# Patient Record
Sex: FEMALE | Race: WHITE
[De-identification: names, ages, dates, MRNs, and addresses within clinical notes are randomized per-mention and may not be internally consistent; named-entity substitution may affect disease eponyms.]

---

## 2022-05-18 ENCOUNTER — APPOINTMENT (OUTPATIENT)
Dept: NEUROLOGY | Facility: CLINIC | Age: 52
End: 2022-05-18
Payer: COMMERCIAL

## 2022-05-18 DIAGNOSIS — R20.0 ANESTHESIA OF SKIN: ICD-10-CM

## 2022-05-18 DIAGNOSIS — R20.2 ANESTHESIA OF SKIN: ICD-10-CM

## 2022-05-18 DIAGNOSIS — M79.641 PAIN IN RIGHT HAND: ICD-10-CM

## 2022-05-18 DIAGNOSIS — M79.642 PAIN IN LEFT HAND: ICD-10-CM

## 2022-05-18 PROBLEM — Z00.00 ENCOUNTER FOR PREVENTIVE HEALTH EXAMINATION: Status: ACTIVE | Noted: 2022-05-18

## 2022-05-18 PROCEDURE — 95886 MUSC TEST DONE W/N TEST COMP: CPT | Mod: 59

## 2022-05-18 PROCEDURE — 95911 NRV CNDJ TEST 9-10 STUDIES: CPT | Mod: 59

## 2022-05-24 ENCOUNTER — APPOINTMENT (OUTPATIENT)
Dept: ORTHOPEDIC SURGERY | Facility: CLINIC | Age: 52
End: 2022-05-24
Payer: COMMERCIAL

## 2022-05-24 DIAGNOSIS — Z78.9 OTHER SPECIFIED HEALTH STATUS: ICD-10-CM

## 2022-05-24 PROCEDURE — L3908: CPT | Mod: 50

## 2022-05-24 PROCEDURE — 99214 OFFICE O/P EST MOD 30 MIN: CPT | Mod: 25

## 2022-05-24 PROCEDURE — 20526 THER INJECTION CARP TUNNEL: CPT | Mod: 50

## 2022-06-09 ENCOUNTER — TRANSCRIPTION ENCOUNTER (OUTPATIENT)
Age: 52
End: 2022-06-09

## 2022-06-21 ENCOUNTER — APPOINTMENT (OUTPATIENT)
Dept: ORTHOPEDIC SURGERY | Facility: CLINIC | Age: 52
End: 2022-06-21
Payer: COMMERCIAL

## 2022-06-21 DIAGNOSIS — G56.03 CARPAL TUNNEL SYNDROM,BILATERAL UPPER LIMBS: ICD-10-CM

## 2022-06-21 PROCEDURE — 99213 OFFICE O/P EST LOW 20 MIN: CPT

## 2022-08-09 ENCOUNTER — APPOINTMENT (OUTPATIENT)
Dept: ORTHOPEDIC SURGERY | Facility: CLINIC | Age: 52
End: 2022-08-09

## 2022-09-15 ENCOUNTER — APPOINTMENT (OUTPATIENT)
Dept: ORTHOPEDIC SURGERY | Facility: CLINIC | Age: 52
End: 2022-09-15

## 2022-09-15 VITALS — WEIGHT: 200 LBS | HEIGHT: 63.5 IN | BODY MASS INDEX: 35 KG/M2

## 2022-09-15 DIAGNOSIS — Z78.9 OTHER SPECIFIED HEALTH STATUS: ICD-10-CM

## 2022-09-15 DIAGNOSIS — M47.896 OTHER SPONDYLOSIS, LUMBAR REGION: ICD-10-CM

## 2022-09-15 PROCEDURE — 73503 X-RAY EXAM HIP UNI 4/> VIEWS: CPT | Mod: RT

## 2022-09-15 PROCEDURE — 99214 OFFICE O/P EST MOD 30 MIN: CPT

## 2022-09-30 ENCOUNTER — APPOINTMENT (OUTPATIENT)
Dept: PAIN MANAGEMENT | Facility: CLINIC | Age: 52
End: 2022-09-30

## 2022-09-30 PROCEDURE — 27093 INJECTION FOR HIP X-RAY: CPT | Mod: RT

## 2022-09-30 PROCEDURE — 73525 CONTRAST X-RAY OF HIP: CPT | Mod: RT

## 2022-09-30 PROCEDURE — J3490M: CUSTOM

## 2022-11-03 ENCOUNTER — APPOINTMENT (OUTPATIENT)
Dept: ORTHOPEDIC SURGERY | Facility: CLINIC | Age: 52
End: 2022-11-03

## 2022-11-03 PROCEDURE — 99213 OFFICE O/P EST LOW 20 MIN: CPT

## 2022-12-30 ENCOUNTER — APPOINTMENT (OUTPATIENT)
Dept: PAIN MANAGEMENT | Facility: CLINIC | Age: 52
End: 2022-12-30
Payer: COMMERCIAL

## 2022-12-30 PROCEDURE — 27093 INJECTION FOR HIP X-RAY: CPT | Mod: RT

## 2022-12-30 PROCEDURE — J3490M: CUSTOM

## 2022-12-30 PROCEDURE — 73525 CONTRAST X-RAY OF HIP: CPT | Mod: 26,59

## 2023-01-19 ENCOUNTER — APPOINTMENT (OUTPATIENT)
Dept: ORTHOPEDIC SURGERY | Facility: CLINIC | Age: 53
End: 2023-01-19

## 2023-03-07 ENCOUNTER — NON-APPOINTMENT (OUTPATIENT)
Age: 53
End: 2023-03-07

## 2023-03-07 ENCOUNTER — APPOINTMENT (OUTPATIENT)
Dept: ORTHOPEDIC SURGERY | Facility: CLINIC | Age: 53
End: 2023-03-07
Payer: COMMERCIAL

## 2023-03-07 VITALS — HEIGHT: 63.5 IN | BODY MASS INDEX: 35 KG/M2 | WEIGHT: 200 LBS

## 2023-03-07 PROCEDURE — 99215 OFFICE O/P EST HI 40 MIN: CPT

## 2023-05-18 ENCOUNTER — OUTPATIENT (OUTPATIENT)
Dept: OUTPATIENT SERVICES | Facility: HOSPITAL | Age: 53
LOS: 1 days | End: 2023-05-18
Payer: COMMERCIAL

## 2023-05-18 VITALS
WEIGHT: 207.9 LBS | HEIGHT: 64 IN | DIASTOLIC BLOOD PRESSURE: 80 MMHG | TEMPERATURE: 98 F | SYSTOLIC BLOOD PRESSURE: 140 MMHG | RESPIRATION RATE: 14 BRPM | HEART RATE: 80 BPM | OXYGEN SATURATION: 99 %

## 2023-05-18 DIAGNOSIS — M16.11 UNILATERAL PRIMARY OSTEOARTHRITIS, RIGHT HIP: ICD-10-CM

## 2023-05-18 DIAGNOSIS — Z98.82 BREAST IMPLANT STATUS: Chronic | ICD-10-CM

## 2023-05-18 DIAGNOSIS — Z01.818 ENCOUNTER FOR OTHER PREPROCEDURAL EXAMINATION: ICD-10-CM

## 2023-05-18 LAB
A1C WITH ESTIMATED AVERAGE GLUCOSE RESULT: 5.1 % — SIGNIFICANT CHANGE UP (ref 4–5.6)
ALBUMIN SERPL ELPH-MCNC: 3.7 G/DL — SIGNIFICANT CHANGE UP (ref 3.3–5)
ALP SERPL-CCNC: 115 U/L — SIGNIFICANT CHANGE UP (ref 30–120)
ALT FLD-CCNC: 45 U/L DA — SIGNIFICANT CHANGE UP (ref 10–60)
ANION GAP SERPL CALC-SCNC: 10 MMOL/L — SIGNIFICANT CHANGE UP (ref 5–17)
APTT BLD: 29 SEC — SIGNIFICANT CHANGE UP (ref 27.5–35.5)
AST SERPL-CCNC: 46 U/L — HIGH (ref 10–40)
BILIRUB SERPL-MCNC: 0.4 MG/DL — SIGNIFICANT CHANGE UP (ref 0.2–1.2)
BLD GP AB SCN SERPL QL: SIGNIFICANT CHANGE UP
BUN SERPL-MCNC: 10 MG/DL — SIGNIFICANT CHANGE UP (ref 7–23)
CALCIUM SERPL-MCNC: 9.5 MG/DL — SIGNIFICANT CHANGE UP (ref 8.4–10.5)
CHLORIDE SERPL-SCNC: 100 MMOL/L — SIGNIFICANT CHANGE UP (ref 96–108)
CO2 SERPL-SCNC: 29 MMOL/L — SIGNIFICANT CHANGE UP (ref 22–31)
CREAT SERPL-MCNC: 0.73 MG/DL — SIGNIFICANT CHANGE UP (ref 0.5–1.3)
EGFR: 99 ML/MIN/1.73M2 — SIGNIFICANT CHANGE UP
ESTIMATED AVERAGE GLUCOSE: 100 MG/DL — SIGNIFICANT CHANGE UP (ref 68–114)
GLUCOSE SERPL-MCNC: 93 MG/DL — SIGNIFICANT CHANGE UP (ref 70–99)
HCT VFR BLD CALC: 39.1 % — SIGNIFICANT CHANGE UP (ref 34.5–45)
HGB BLD-MCNC: 13.4 G/DL — SIGNIFICANT CHANGE UP (ref 11.5–15.5)
INR BLD: 0.94 RATIO — SIGNIFICANT CHANGE UP (ref 0.88–1.16)
MCHC RBC-ENTMCNC: 32.4 PG — SIGNIFICANT CHANGE UP (ref 27–34)
MCHC RBC-ENTMCNC: 34.3 GM/DL — SIGNIFICANT CHANGE UP (ref 32–36)
MCV RBC AUTO: 94.7 FL — SIGNIFICANT CHANGE UP (ref 80–100)
NRBC # BLD: 0 /100 WBCS — SIGNIFICANT CHANGE UP (ref 0–0)
PLATELET # BLD AUTO: 210 K/UL — SIGNIFICANT CHANGE UP (ref 150–400)
POTASSIUM SERPL-MCNC: 4.2 MMOL/L — SIGNIFICANT CHANGE UP (ref 3.5–5.3)
POTASSIUM SERPL-SCNC: 4.2 MMOL/L — SIGNIFICANT CHANGE UP (ref 3.5–5.3)
PROT SERPL-MCNC: 7.8 G/DL — SIGNIFICANT CHANGE UP (ref 6–8.3)
PROTHROM AB SERPL-ACNC: 11.1 SEC — SIGNIFICANT CHANGE UP (ref 10.5–13.4)
RBC # BLD: 4.13 M/UL — SIGNIFICANT CHANGE UP (ref 3.8–5.2)
RBC # FLD: 13 % — SIGNIFICANT CHANGE UP (ref 10.3–14.5)
SODIUM SERPL-SCNC: 139 MMOL/L — SIGNIFICANT CHANGE UP (ref 135–145)
WBC # BLD: 6.02 K/UL — SIGNIFICANT CHANGE UP (ref 3.8–10.5)
WBC # FLD AUTO: 6.02 K/UL — SIGNIFICANT CHANGE UP (ref 3.8–10.5)

## 2023-05-18 PROCEDURE — 86901 BLOOD TYPING SEROLOGIC RH(D): CPT

## 2023-05-18 PROCEDURE — 85027 COMPLETE CBC AUTOMATED: CPT

## 2023-05-18 PROCEDURE — 80053 COMPREHEN METABOLIC PANEL: CPT

## 2023-05-18 PROCEDURE — 85610 PROTHROMBIN TIME: CPT

## 2023-05-18 PROCEDURE — 36415 COLL VENOUS BLD VENIPUNCTURE: CPT

## 2023-05-18 PROCEDURE — 93005 ELECTROCARDIOGRAM TRACING: CPT

## 2023-05-18 PROCEDURE — 85730 THROMBOPLASTIN TIME PARTIAL: CPT

## 2023-05-18 PROCEDURE — 86850 RBC ANTIBODY SCREEN: CPT

## 2023-05-18 PROCEDURE — 83036 HEMOGLOBIN GLYCOSYLATED A1C: CPT

## 2023-05-18 PROCEDURE — G0463: CPT

## 2023-05-18 PROCEDURE — 86900 BLOOD TYPING SEROLOGIC ABO: CPT

## 2023-05-18 PROCEDURE — 93010 ELECTROCARDIOGRAM REPORT: CPT

## 2023-05-18 PROCEDURE — 87640 STAPH A DNA AMP PROBE: CPT

## 2023-05-18 PROCEDURE — 87641 MR-STAPH DNA AMP PROBE: CPT

## 2023-05-18 NOTE — H&P PST ADULT - HISTORY OF PRESENT ILLNESS
This is a 54 y/o female who presents with progressively worsening right gip pain and limping for the past 10 months . Reports constant pain and worse pain when walking , pain affects the sleep and wakes her from sleep . scheduled for right hip replacement on 6/5/23

## 2023-05-18 NOTE — H&P PST ADULT - PROBLEM SELECTOR PLAN 1
scheduled for right hip replacement on 6/8/23   medical clearance   Pre op instructions   [facility] on admit

## 2023-05-18 NOTE — H&P PST ADULT - NSICDXPASTMEDICALHX_GEN_ALL_CORE_FT
PAST MEDICAL HISTORY:  Anxiety and depression     HTN (hypertension)     Osteoarthritis of right hip

## 2023-05-19 LAB
MRSA PCR RESULT.: SIGNIFICANT CHANGE UP
S AUREUS DNA NOSE QL NAA+PROBE: SIGNIFICANT CHANGE UP

## 2023-05-30 ENCOUNTER — APPOINTMENT (OUTPATIENT)
Dept: ORTHOPEDIC SURGERY | Facility: CLINIC | Age: 53
End: 2023-05-30
Payer: COMMERCIAL

## 2023-05-30 ENCOUNTER — APPOINTMENT (OUTPATIENT)
Dept: ORTHOPEDIC SURGERY | Facility: CLINIC | Age: 53
End: 2023-05-30

## 2023-05-30 VITALS — BODY MASS INDEX: 35 KG/M2 | HEIGHT: 63.5 IN | WEIGHT: 200 LBS

## 2023-05-30 DIAGNOSIS — M16.11 UNILATERAL PRIMARY OSTEOARTHRITIS, RIGHT HIP: ICD-10-CM

## 2023-05-30 PROCEDURE — 73503 X-RAY EXAM HIP UNI 4/> VIEWS: CPT | Mod: RT

## 2023-05-30 PROCEDURE — 99213 OFFICE O/P EST LOW 20 MIN: CPT

## 2023-05-31 RX ORDER — CHLORHEXIDINE GLUCONATE 213 G/1000ML
1 SOLUTION TOPICAL ONCE
Refills: 0 | Status: COMPLETED | OUTPATIENT
Start: 2023-06-05 | End: 2023-06-05

## 2023-05-31 RX ORDER — ACETAMINOPHEN 500 MG
1000 TABLET ORAL ONCE
Refills: 0 | Status: COMPLETED | OUTPATIENT
Start: 2023-06-05 | End: 2023-06-05

## 2023-05-31 RX ORDER — TRANEXAMIC ACID 100 MG/ML
1000 INJECTION, SOLUTION INTRAVENOUS ONCE
Refills: 0 | Status: COMPLETED | OUTPATIENT
Start: 2023-06-05 | End: 2023-06-05

## 2023-05-31 RX ORDER — CEFAZOLIN SODIUM 1 G
2000 VIAL (EA) INJECTION ONCE
Refills: 0 | Status: COMPLETED | OUTPATIENT
Start: 2023-06-05 | End: 2023-06-05

## 2023-05-31 RX ORDER — APREPITANT 80 MG/1
40 CAPSULE ORAL ONCE
Refills: 0 | Status: COMPLETED | OUTPATIENT
Start: 2023-06-05 | End: 2023-06-05

## 2023-06-04 ENCOUNTER — TRANSCRIPTION ENCOUNTER (OUTPATIENT)
Age: 53
End: 2023-06-04

## 2023-06-05 ENCOUNTER — TRANSCRIPTION ENCOUNTER (OUTPATIENT)
Age: 53
End: 2023-06-05

## 2023-06-05 ENCOUNTER — INPATIENT (INPATIENT)
Facility: HOSPITAL | Age: 53
LOS: 0 days | Discharge: HOME | DRG: 470 | End: 2023-06-06
Attending: ORTHOPAEDIC SURGERY | Admitting: ORTHOPAEDIC SURGERY
Payer: COMMERCIAL

## 2023-06-05 ENCOUNTER — APPOINTMENT (OUTPATIENT)
Dept: ORTHOPEDIC SURGERY | Facility: HOSPITAL | Age: 53
End: 2023-06-05
Payer: COMMERCIAL

## 2023-06-05 VITALS
TEMPERATURE: 98 F | DIASTOLIC BLOOD PRESSURE: 99 MMHG | RESPIRATION RATE: 18 BRPM | WEIGHT: 205.25 LBS | HEIGHT: 63.5 IN | SYSTOLIC BLOOD PRESSURE: 141 MMHG | OXYGEN SATURATION: 98 % | HEART RATE: 95 BPM

## 2023-06-05 DIAGNOSIS — M16.11 UNILATERAL PRIMARY OSTEOARTHRITIS, RIGHT HIP: ICD-10-CM

## 2023-06-05 DIAGNOSIS — Z98.82 BREAST IMPLANT STATUS: Chronic | ICD-10-CM

## 2023-06-05 DIAGNOSIS — Z98.890 OTHER SPECIFIED POSTPROCEDURAL STATES: Chronic | ICD-10-CM

## 2023-06-05 LAB
ABO RH CONFIRMATION: SIGNIFICANT CHANGE UP
HCG UR QL: NEGATIVE — SIGNIFICANT CHANGE UP

## 2023-06-05 PROCEDURE — 72170 X-RAY EXAM OF PELVIS: CPT | Mod: 26

## 2023-06-05 PROCEDURE — 20985 CPTR-ASST DIR MS PX: CPT

## 2023-06-05 PROCEDURE — 73502 X-RAY EXAM HIP UNI 2-3 VIEWS: CPT | Mod: 26

## 2023-06-05 PROCEDURE — 99232 SBSQ HOSP IP/OBS MODERATE 35: CPT

## 2023-06-05 PROCEDURE — 27130 TOTAL HIP ARTHROPLASTY: CPT | Mod: RT

## 2023-06-05 PROCEDURE — 20610 DRAIN/INJ JOINT/BURSA W/O US: CPT | Mod: 59,RT

## 2023-06-05 PROCEDURE — 27130 TOTAL HIP ARTHROPLASTY: CPT | Mod: AS,RT

## 2023-06-05 DEVICE — SCREW PELVIC G2 STD 116MM: Type: IMPLANTABLE DEVICE | Site: RIGHT | Status: FUNCTIONAL

## 2023-06-05 DEVICE — STEM FEM POLARSTEM COLLAR STD TI HA 0 135 DEG 12/14 125MM: Type: IMPLANTABLE DEVICE | Site: RIGHT | Status: FUNCTIONAL

## 2023-06-05 DEVICE — LINER ACET OR3O DUAL MOBILITY 42/54: Type: IMPLANTABLE DEVICE | Site: RIGHT | Status: FUNCTIONAL

## 2023-06-05 DEVICE — INSERT ACET OR3O DM XLPE 28/42: Type: IMPLANTABLE DEVICE | Site: RIGHT | Status: FUNCTIONAL

## 2023-06-05 DEVICE — SHELL ACET R3 STD NO HOLE 54MM: Type: IMPLANTABLE DEVICE | Site: RIGHT | Status: FUNCTIONAL

## 2023-06-05 DEVICE — HEAD TAPER FEMORAL 28MM OXINIUM: Type: IMPLANTABLE DEVICE | Site: RIGHT | Status: FUNCTIONAL

## 2023-06-05 RX ORDER — ACETAMINOPHEN 500 MG
1000 TABLET ORAL EVERY 8 HOURS
Refills: 0 | Status: DISCONTINUED | OUTPATIENT
Start: 2023-06-05 | End: 2023-06-06

## 2023-06-05 RX ORDER — ONDANSETRON 8 MG/1
4 TABLET, FILM COATED ORAL EVERY 6 HOURS
Refills: 0 | Status: DISCONTINUED | OUTPATIENT
Start: 2023-06-05 | End: 2023-06-06

## 2023-06-05 RX ORDER — ONDANSETRON 8 MG/1
4 TABLET, FILM COATED ORAL ONCE
Refills: 0 | Status: DISCONTINUED | OUTPATIENT
Start: 2023-06-05 | End: 2023-06-05

## 2023-06-05 RX ORDER — CEFAZOLIN SODIUM 1 G
2000 VIAL (EA) INJECTION EVERY 8 HOURS
Refills: 0 | Status: COMPLETED | OUTPATIENT
Start: 2023-06-05 | End: 2023-06-06

## 2023-06-05 RX ORDER — SODIUM CHLORIDE 9 MG/ML
1000 INJECTION, SOLUTION INTRAVENOUS
Refills: 0 | Status: DISCONTINUED | OUTPATIENT
Start: 2023-06-05 | End: 2023-06-05

## 2023-06-05 RX ORDER — HYDROMORPHONE HYDROCHLORIDE 2 MG/ML
4 INJECTION INTRAMUSCULAR; INTRAVENOUS; SUBCUTANEOUS
Refills: 0 | Status: DISCONTINUED | OUTPATIENT
Start: 2023-06-05 | End: 2023-06-06

## 2023-06-05 RX ORDER — ASPIRIN/CALCIUM CARB/MAGNESIUM 324 MG
81 TABLET ORAL EVERY 12 HOURS
Refills: 0 | Status: DISCONTINUED | OUTPATIENT
Start: 2023-06-06 | End: 2023-06-06

## 2023-06-05 RX ORDER — HYDROMORPHONE HYDROCHLORIDE 2 MG/ML
0.5 INJECTION INTRAMUSCULAR; INTRAVENOUS; SUBCUTANEOUS
Refills: 0 | Status: DISCONTINUED | OUTPATIENT
Start: 2023-06-05 | End: 2023-06-05

## 2023-06-05 RX ORDER — SODIUM CHLORIDE 9 MG/ML
1000 INJECTION, SOLUTION INTRAVENOUS
Refills: 0 | Status: DISCONTINUED | OUTPATIENT
Start: 2023-06-05 | End: 2023-06-06

## 2023-06-05 RX ORDER — LOSARTAN POTASSIUM 100 MG/1
50 TABLET, FILM COATED ORAL DAILY
Refills: 0 | Status: CANCELLED | OUTPATIENT
Start: 2023-06-07 | End: 2023-06-06

## 2023-06-05 RX ORDER — BUPROPION HYDROCHLORIDE 150 MG/1
300 TABLET, EXTENDED RELEASE ORAL DAILY
Refills: 0 | Status: DISCONTINUED | OUTPATIENT
Start: 2023-06-05 | End: 2023-06-06

## 2023-06-05 RX ORDER — CELECOXIB 200 MG/1
200 CAPSULE ORAL EVERY 12 HOURS
Refills: 0 | Status: DISCONTINUED | OUTPATIENT
Start: 2023-06-05 | End: 2023-06-06

## 2023-06-05 RX ORDER — MAGNESIUM HYDROXIDE 400 MG/1
30 TABLET, CHEWABLE ORAL DAILY
Refills: 0 | Status: DISCONTINUED | OUTPATIENT
Start: 2023-06-05 | End: 2023-06-06

## 2023-06-05 RX ORDER — SENNA PLUS 8.6 MG/1
2 TABLET ORAL AT BEDTIME
Refills: 0 | Status: DISCONTINUED | OUTPATIENT
Start: 2023-06-05 | End: 2023-06-06

## 2023-06-05 RX ORDER — HYDROMORPHONE HYDROCHLORIDE 2 MG/ML
2 INJECTION INTRAMUSCULAR; INTRAVENOUS; SUBCUTANEOUS
Refills: 0 | Status: DISCONTINUED | OUTPATIENT
Start: 2023-06-05 | End: 2023-06-06

## 2023-06-05 RX ORDER — DEXAMETHASONE 0.5 MG/5ML
8 ELIXIR ORAL ONCE
Refills: 0 | Status: COMPLETED | OUTPATIENT
Start: 2023-06-06 | End: 2023-06-06

## 2023-06-05 RX ORDER — ACETAMINOPHEN 500 MG
1000 TABLET ORAL ONCE
Refills: 0 | Status: COMPLETED | OUTPATIENT
Start: 2023-06-05 | End: 2023-06-05

## 2023-06-05 RX ORDER — HYDROMORPHONE HYDROCHLORIDE 2 MG/ML
0.5 INJECTION INTRAMUSCULAR; INTRAVENOUS; SUBCUTANEOUS
Refills: 0 | Status: DISCONTINUED | OUTPATIENT
Start: 2023-06-05 | End: 2023-06-06

## 2023-06-05 RX ORDER — POLYETHYLENE GLYCOL 3350 17 G/17G
17 POWDER, FOR SOLUTION ORAL AT BEDTIME
Refills: 0 | Status: DISCONTINUED | OUTPATIENT
Start: 2023-06-05 | End: 2023-06-06

## 2023-06-05 RX ORDER — HYDROMORPHONE HYDROCHLORIDE 2 MG/ML
1 INJECTION INTRAMUSCULAR; INTRAVENOUS; SUBCUTANEOUS
Refills: 0 | Status: DISCONTINUED | OUTPATIENT
Start: 2023-06-05 | End: 2023-06-05

## 2023-06-05 RX ORDER — PANTOPRAZOLE SODIUM 20 MG/1
40 TABLET, DELAYED RELEASE ORAL
Refills: 0 | Status: DISCONTINUED | OUTPATIENT
Start: 2023-06-05 | End: 2023-06-06

## 2023-06-05 RX ADMIN — Medication 400 MILLIGRAM(S): at 15:32

## 2023-06-05 RX ADMIN — HYDROMORPHONE HYDROCHLORIDE 2 MILLIGRAM(S): 2 INJECTION INTRAMUSCULAR; INTRAVENOUS; SUBCUTANEOUS at 19:25

## 2023-06-05 RX ADMIN — CELECOXIB 200 MILLIGRAM(S): 200 CAPSULE ORAL at 21:29

## 2023-06-05 RX ADMIN — HYDROMORPHONE HYDROCHLORIDE 2 MILLIGRAM(S): 2 INJECTION INTRAMUSCULAR; INTRAVENOUS; SUBCUTANEOUS at 20:00

## 2023-06-05 RX ADMIN — POLYETHYLENE GLYCOL 3350 17 GRAM(S): 17 POWDER, FOR SOLUTION ORAL at 21:30

## 2023-06-05 RX ADMIN — Medication 100 MILLIGRAM(S): at 16:33

## 2023-06-05 RX ADMIN — Medication 1000 MILLIGRAM(S): at 21:29

## 2023-06-05 RX ADMIN — SENNA PLUS 2 TABLET(S): 8.6 TABLET ORAL at 21:30

## 2023-06-05 RX ADMIN — Medication 1000 MILLIGRAM(S): at 17:28

## 2023-06-05 RX ADMIN — CELECOXIB 200 MILLIGRAM(S): 200 CAPSULE ORAL at 21:34

## 2023-06-05 RX ADMIN — APREPITANT 40 MILLIGRAM(S): 80 CAPSULE ORAL at 07:53

## 2023-06-05 RX ADMIN — CHLORHEXIDINE GLUCONATE 1 APPLICATION(S): 213 SOLUTION TOPICAL at 07:53

## 2023-06-05 RX ADMIN — Medication 1000 MILLIGRAM(S): at 21:35

## 2023-06-05 NOTE — CONSULT NOTE ADULT - SUBJECTIVE AND OBJECTIVE BOX
HPI: 52yo PMHx HTN and anxiety/depression has been combatting pain in right hip for several months which has progressively worsened.  Patient has tried multiple options for pain relief including OTC medication and as well as PT with minimal relief and has undergone elective replacement of right hip successfully.  Patient is currently resting in bed comfortable with good pain control.     REVIEW OF SYSTEMS:  CONSTITUTIONAL: No fever, weight loss, or fatigue  EYES: No eye pain, visual disturbances, or discharge  ENMT:  No difficulty hearing, tinnitus, vertigo; No sinus or throat pain  NECK: No pain or stiffness  RESPIRATORY: No cough, wheezing, chills or hemoptysis; No shortness of breath  CARDIOVASCULAR: No chest pain, palpitations, dizziness, or leg swelling  GASTROINTESTINAL: No abdominal or epigastric pain. No nausea, vomiting, or hematemesis; No diarrhea or constipation. No melena or hematochezia.  GENITOURINARY: No dysuria, frequency, hematuria, or incontinence  NEUROLOGICAL: No headaches, memory loss, loss of strength, numbness, or tremors  MUSCULOSKELETAL: No muscle or back pain      PAST MEDICAL & SURGICAL HISTORY:  HTN (hypertension)      Osteoarthritis of right hip      Anxiety and depression      S/P breast augmentation      H/O colonoscopy          SOCIAL HISTORY:  Tobacco; EtOH; Illicit Drugs    Allergies    Cipro (Other)  ampicillin (Other)    Intolerances        MEDICATIONS  (STANDING):  acetaminophen     Tablet .. 1000 milliGRAM(s) Oral every 8 hours  acetaminophen   IVPB .. 1000 milliGRAM(s) IV Intermittent once  buPROPion XL (24-Hour) . 300 milliGRAM(s) Oral daily  ceFAZolin   IVPB 2000 milliGRAM(s) IV Intermittent every 8 hours  celecoxib 200 milliGRAM(s) Oral every 12 hours  lactated ringers. 1000 milliLiter(s) (150 mL/Hr) IV Continuous <Continuous>  lactated ringers. 1000 milliLiter(s) (75 mL/Hr) IV Continuous <Continuous>  pantoprazole    Tablet 40 milliGRAM(s) Oral before breakfast  polyethylene glycol 3350 17 Gram(s) Oral at bedtime  senna 2 Tablet(s) Oral at bedtime    MEDICATIONS  (PRN):  aluminum hydroxide/magnesium hydroxide/simethicone Suspension 30 milliLiter(s) Oral four times a day PRN Indigestion  HYDROmorphone   Tablet 2 milliGRAM(s) Oral every 3 hours PRN Moderate Pain (4 - 6)  HYDROmorphone   Tablet 4 milliGRAM(s) Oral every 3 hours PRN Severe Pain (7 - 10)  HYDROmorphone  Injectable 0.5 milliGRAM(s) IV Push every 3 hours PRN for breakthrough pain  HYDROmorphone  Injectable 0.5 milliGRAM(s) IV Push every 10 minutes PRN Moderate Pain (4 - 6)  HYDROmorphone  Injectable 1 milliGRAM(s) IV Push every 10 minutes PRN Severe Pain (7 - 10)  magnesium hydroxide Suspension 30 milliLiter(s) Oral daily PRN Constipation  ondansetron Injectable 4 milliGRAM(s) IV Push once PRN Nausea and/or Vomiting  ondansetron Injectable 4 milliGRAM(s) IV Push every 6 hours PRN Nausea and/or Vomiting      FAMILY HISTORY:      Vital Signs Last 24 Hrs  T(C): 36.9 (05 Jun 2023 13:42), Max: 36.9 (05 Jun 2023 13:42)  T(F): 98.4 (05 Jun 2023 13:42), Max: 98.4 (05 Jun 2023 13:42)  HR: 92 (05 Jun 2023 13:42) (56 - 95)  BP: 118/78 (05 Jun 2023 13:42) (90/62 - 145/66)  BP(mean): --  RR: 16 (05 Jun 2023 13:42) (12 - 20)  SpO2: 97% (05 Jun 2023 13:42) (97% - 100%)    Parameters below as of 05 Jun 2023 12:20  Patient On (Oxygen Delivery Method): nasal cannula        PHYSICAL EXAM:    GENERAL: NAD, well-developed  HEAD:  Atraumatic, Normocephalic  EYES: EOMI, PERRLA, conjunctiva and sclera clear  ENMT: No tonsillar erythema, exudates, or enlargement; Moist mucous membranes, Good dentition, No lesions  NECK: Supple, No JVD, Normal thyroid  NERVOUS SYSTEM:  Alert & Oriented X3, Good concentration;  CHEST/LUNG: Clear to auscultation bilaterally; No rales, rhonchi, wheezing, or rubs  HEART: Regular rate and rhythm; No murmurs, rubs, or gallops  ABDOMEN: Soft, Nontender, Nondistended; Bowel sounds present  EXTREMITIES:  2+ Peripheral Pulses, No clubbing, cyanosis, or edema      LABS:              CAPILLARY BLOOD GLUCOSE          RADIOLOGY & ADDITIONAL STUDIES:    EKG:   Personally Reviewed:  [ ] YES     Imaging:   Personally Reviewed:  [ ] YES     Consultant(s) Notes Reviewed:      Care Discussed with Consultants/Other Providers:

## 2023-06-05 NOTE — PHYSICAL THERAPY INITIAL EVALUATION ADULT - MD/RN NOTIFIED
PLAN:  Follow up as directed.    Additional Educational Resources:  For additional resources regarding your symptoms, diagnosis, or further health information, please visit the Online Health Resources section in MyChart.  
yes

## 2023-06-05 NOTE — PHYSICAL THERAPY INITIAL EVALUATION ADULT - GAIT TRAINING, PT EVAL
Patient will ambulate 150 feet independently with a rolling walker within 1-2 days for safe community ambulation. Patient will ascend/descend 6 stairs independently with +HR and straight cane within 1-2 days for safe household stair negotiation.

## 2023-06-05 NOTE — PHYSICAL THERAPY INITIAL EVALUATION ADULT - ADDITIONAL COMMENTS
Pt lives with  and mother in a private home, there are 6 stairs +HR to enter and 5 stairs +HR to the primary bedroom/bathroom. Pt has a walk in shower. PTA pt was independent with ADLs and ambulation.

## 2023-06-05 NOTE — BRIEF OPERATIVE NOTE - NSICDXBRIEFPOSTOP_GEN_ALL_CORE_FT
Admission
POST-OP DIAGNOSIS:  Primary localized osteoarthritis of right hip 05-Jun-2023 07:51:45  Elian Cabrales

## 2023-06-05 NOTE — CONSULT NOTE ADULT - ASSESSMENT
52yo PMHx HTN and anxiety/depression s/p R MYLES.     #s/p R MYLES  OT/PT eval  pain and BM regimen per ortho  DVT ppx  Incentive spirometry  obtain basic labs    #HTN  resume home Telmisartan - HCTZ on pod2    #Depresison/anxiety  c/w home bupropion

## 2023-06-05 NOTE — PATIENT PROFILE ADULT - FALL HARM RISK - UNIVERSAL INTERVENTIONS
Bed in lowest position, wheels locked, appropriate side rails in place/Call bell, personal items and telephone in reach/Instruct patient to call for assistance before getting out of bed or chair/Non-slip footwear when patient is out of bed/[place] to call system/Physically safe environment - no spills, clutter or unnecessary equipment/Purposeful Proactive Rounding/Room/bathroom lighting operational, light cord in reach

## 2023-06-05 NOTE — DISCHARGE NOTE PROVIDER - NSDCCPCAREPLAN_GEN_ALL_CORE_FT
PRINCIPAL DISCHARGE DIAGNOSIS  Diagnosis: Primary localized osteoarthritis of right hip  Assessment and Plan of Treatment: Physical Therapy /Occupational Therapy for: Ambulation, Transfers , Stairs, ADLs (activities of daily living), isometrics.  TOTAL HIP PRECAUTIONS  *Remember to continue all of the precautions for total hip replacement. Your surgeon will tell you when and if you can move beyond these limitations.  • Do not bend your hip more than 90 degrees   • Do not cross your legs or ankles when laying sitting or standing.  • DO NOT bend over at your waist.  • Avoid sitting in low, soft chairs such as sofas and car seats. You should sit on a chair using firm pillows to raise the height of the seat.  • Make sure your bed level is high, so that you maintain proper leg positioning when sitting on the side, or getting in or out.  • When entering and traveling by car, sit in the front passenger seat. Make sure that the car seat is all the way back and semi-reclined before entering.  • Do not allow your knees to come together when sitting or lying in bed. Use abduction pillow.  • Do not take a tub bath yet.   • Do not resume driving until you have your surgeon’s permission.  Keep incision clean and dry. May shower after surgery if no drainage from incision.  You have a ONOFRE negative pressure and water resistant dressing over your surgical wound and may shower.  Disconnect ONOFRE battery prior to showering, reconnect battery after showering and press orange button to resume ONOFRE power. Remove ONOFRE dressing 7 days after surgery or when the battery alarms and stops working. If drainage is noted from your wound, apply a dry sterile dressing and call your surgeon.     PRINCIPAL DISCHARGE DIAGNOSIS  Diagnosis: Primary localized osteoarthritis of right hip  Assessment and Plan of Treatment: Physical Therapy /Occupational Therapy for: Ambulation, Transfers , Stairs, ADLs (activities of daily living), isometrics.  TOTAL HIP PRECAUTIONS  *Remember to continue all of the precautions for total hip replacement. Your surgeon will tell you when and if you can move beyond these limitations.  • Do not bend your hip more than 90 degrees   • Do not cross your legs or ankles when laying sitting or standing.  • DO NOT bend over at your waist.  • Avoid sitting in low, soft chairs such as sofas and car seats. You should sit on a chair using firm pillows to raise the height of the seat.  • Make sure your bed level is high, so that you maintain proper leg positioning when sitting on the side, or getting in or out.  • When entering and traveling by car, sit in the front passenger seat. Make sure that the car seat is all the way back and semi-reclined before entering.  • Do not allow your knees to come together when sitting or lying in bed. Use regular pillow.  • Do not take a tub bath yet.   • Do not resume driving until you have your surgeon’s permission.  As per Dr. Jesus:  Patient does not need abduction pillow at home.  Patient can sleep with regular pillow between their knees.  Patient can sleep up on non operative side.  Keep incision clean and dry. May shower after surgery if no drainage from incision.  You have a ONOFRE negative pressure and water resistant dressing over your surgical wound and may shower.  Disconnect ONOFRE battery prior to showering, reconnect battery after showering and press orange button to resume ONOFRE power. Remove ONOFRE dressing 7 days after surgery or when the battery alarms and stops working. If drainage is noted from your wound, apply a dry sterile dressing and call your surgeon.  Opioid safety discussed w/ pt.  Do not keep opioid in the medicine cabinet in bathroom where others may have access to it.  Do not drive while taking opioid.  To dispose of it some pharmacies do have drop boxes as do police stations.  Do not flush or put in trash.

## 2023-06-05 NOTE — DISCHARGE NOTE PROVIDER - PROVIDER TOKENS
PROVIDER:[TOKEN:[[id number]],SCHEDULEDAPPT:[06/15/2023],SCHEDULEDAPPTTIME:[10:15 AM]] FREE:[LAST:[[place]],FIRST:[Dagoberto],PHONE:[phone]],FAX:[(   [phone]    -],ADDRESS:[[address] [place] [address]],SCHEDULEDAPPT:[06/15/2023],SCHEDULEDAPPTTIME:[10:15 AM]]

## 2023-06-05 NOTE — DISCHARGE NOTE PROVIDER - NSDCACTIVITY_GEN_ALL_CORE
Stairs allowed/Walking - Indoors allowed/Walking - Outdoors allowed/Follow Instructions Provided by your Surgical Team Do not drive or operate machinery/Showering allowed/Stairs allowed/Walking - Indoors allowed/No heavy lifting/straining/Walking - Outdoors allowed/Follow Instructions Provided by your Surgical Team

## 2023-06-05 NOTE — PHYSICAL THERAPY INITIAL EVALUATION ADULT - PERTINENT HX OF CURRENT PROBLEM, REHAB EVAL
Pt is a 54 y/o female with right hip primary OA. Pt is s/p right total hip replacement posterior approach on 6/5/23.

## 2023-06-05 NOTE — DISCHARGE NOTE PROVIDER - CARE PROVIDER_API CALL
Dagoberto Jesus Cj  Orthopaedic Surgery  [address] [place] [id number]  Phone: [phone]  Fax: [phone]  Scheduled Appointment: 06/15/2023 10:15 AM   Dagoberto Jesus  [address] [place], [place] [address]  Phone: [phone]  Fax: (   )    -  Scheduled Appointment: 06/15/2023 10:15 AM

## 2023-06-05 NOTE — DISCHARGE NOTE PROVIDER - NSDCMRMEDTOKEN_GEN_ALL_CORE_FT
buPROPion 300 mg/24 hours (XL) oral tablet, extended release: 1 orally once a day  telmisartan-hydrochlorothiazide 40 mg-12.5 mg oral tablet: 1 orally once a day   acetaminophen 500 mg oral tablet: 2 tab(s) orally every 8 hours  Aspirin Enteric Coated 81 mg oral delayed release tablet: 1 tab(s) orally every 12 hours take 2 hours before Celebrex/Meloxicam  buPROPion 300 mg/24 hours (XL) oral tablet, extended release: 1 orally once a day  CeleBREX 200 mg oral capsule: 1 cap(s) orally every 12 hours take 2 hours after aspirin  HYDROmorphone 2 mg oral tablet: 1 tab(s) orally every 4 hours as needed for  moderate pain may take 2 tabs as needed for severe pain. Do Not combine with other sedating medications. Do Not Exceed Max Daily dose of 6 tabs. [facility] : [id number] MDD: 6  omeprazole 20 mg oral delayed release capsule: 1 cap(s) orally once a day prior to a meal (breakfast)  telmisartan-hydrochlorothiazide 40 mg-12.5 mg oral tablet: 1 orally once a day

## 2023-06-05 NOTE — PATIENT PROFILE ADULT - FUNCTIONAL ASSESSMENT - BASIC MOBILITY SCORE.
10 ml of contrast were injected throughout the case. 0 mL of contrast was the total wasted during the case. 10 mL was the total amount used during the case.
5
24

## 2023-06-05 NOTE — OCCUPATIONAL THERAPY INITIAL EVALUATION ADULT - ADDITIONAL COMMENTS
Pt lives with  and mother in a private house. There are six JUNI +1 HR and five steps inside with 1 HR. Pt has a walk-in shower. Pt does not own any equipment and needs RW and commode.

## 2023-06-05 NOTE — CHART NOTE - NSCHARTNOTEFT_GEN_A_CORE
notified by nurse that the patients ONOFRE was pulled out. Upon examination at bedside patient explained that the ONOFRE wire got caught and was pulled out. Dressing intact with ONOFRE cord pulled at site of attachment. The old ONOFRE was removed and a new dressing was applied without complication. New ONOFRE is functioning as normal, patient was instructed about awareness of ONOFRE cord.

## 2023-06-05 NOTE — DISCHARGE NOTE PROVIDER - HOSPITAL COURSE
This is 53y Female patient was admitted to [facility] with a history Primary localized osteoarthritis of right hip.  Patient went to Pre-Surgical Testing at [facility] and was medically cleared to undergo elective procedure.    The patient underwent a Right total hip arthroplasty by Dagoberto Jesus on 06-05-23.   No operative or evangelina-operative complications arose during patients hospital course.    Patient received antibiotic according to SCIP guidelines for infection prevention.   Aspirin was given for DVT prophylaxis.    Anesthesia, Medical Hospitalist, Physical Therapy and Occupational Therapy were consulted. Patient is stable for discharge with a good prognosis.  Appropriate discharge instructions and medications are provided in this document. This is 53y Female patient was admitted to [facility] with a history Primary localized osteoarthritis of right hip.  Patient went to Pre-Surgical Testing at [facility] and was medically cleared to undergo elective procedure.    The patient underwent a Right total hip arthroplasty by Dagoberto Jesus on 06-05-23.   No operative or evangelina-operative complications arose during patients hospital course.    Patient received antibiotic according to SCIP guidelines for infection prevention.   Aspirin was given for DVT prophylaxis.    Anesthesia, Medical Hospitalist, Physical Therapy and Occupational Therapy were consulted. Patient is stable for discharge with a good prognosis.  Appropriate discharge instructions and medications are provided in this document.     Patient is going home with home care (PT/OT/Skilled Nursing)

## 2023-06-05 NOTE — DISCHARGE NOTE PROVIDER - NSDCFUSCHEDAPPT_GEN_ALL_CORE_FT
Dagoberto Jesus  [facility]  [address]  Scheduled Appointment: 06/15/2023

## 2023-06-06 ENCOUNTER — TRANSCRIPTION ENCOUNTER (OUTPATIENT)
Age: 53
End: 2023-06-06

## 2023-06-06 VITALS
HEART RATE: 78 BPM | TEMPERATURE: 98 F | RESPIRATION RATE: 18 BRPM | SYSTOLIC BLOOD PRESSURE: 143 MMHG | DIASTOLIC BLOOD PRESSURE: 94 MMHG | OXYGEN SATURATION: 98 %

## 2023-06-06 LAB
ANION GAP SERPL CALC-SCNC: 9 MMOL/L — SIGNIFICANT CHANGE UP (ref 5–17)
BUN SERPL-MCNC: 13 MG/DL — SIGNIFICANT CHANGE UP (ref 7–23)
CALCIUM SERPL-MCNC: 9.1 MG/DL — SIGNIFICANT CHANGE UP (ref 8.4–10.5)
CHLORIDE SERPL-SCNC: 102 MMOL/L — SIGNIFICANT CHANGE UP (ref 96–108)
CO2 SERPL-SCNC: 28 MMOL/L — SIGNIFICANT CHANGE UP (ref 22–31)
CREAT SERPL-MCNC: 0.76 MG/DL — SIGNIFICANT CHANGE UP (ref 0.5–1.3)
EGFR: 94 ML/MIN/1.73M2 — SIGNIFICANT CHANGE UP
GLUCOSE SERPL-MCNC: 127 MG/DL — HIGH (ref 70–99)
HCT VFR BLD CALC: 34.3 % — LOW (ref 34.5–45)
HGB BLD-MCNC: 11.7 G/DL — SIGNIFICANT CHANGE UP (ref 11.5–15.5)
MCHC RBC-ENTMCNC: 32.9 PG — SIGNIFICANT CHANGE UP (ref 27–34)
MCHC RBC-ENTMCNC: 34.1 GM/DL — SIGNIFICANT CHANGE UP (ref 32–36)
MCV RBC AUTO: 96.3 FL — SIGNIFICANT CHANGE UP (ref 80–100)
NRBC # BLD: 0 /100 WBCS — SIGNIFICANT CHANGE UP (ref 0–0)
PLATELET # BLD AUTO: 173 K/UL — SIGNIFICANT CHANGE UP (ref 150–400)
POTASSIUM SERPL-MCNC: 3.9 MMOL/L — SIGNIFICANT CHANGE UP (ref 3.5–5.3)
POTASSIUM SERPL-SCNC: 3.9 MMOL/L — SIGNIFICANT CHANGE UP (ref 3.5–5.3)
RBC # BLD: 3.56 M/UL — LOW (ref 3.8–5.2)
RBC # FLD: 13 % — SIGNIFICANT CHANGE UP (ref 10.3–14.5)
SODIUM SERPL-SCNC: 139 MMOL/L — SIGNIFICANT CHANGE UP (ref 135–145)
WBC # BLD: 9.99 K/UL — SIGNIFICANT CHANGE UP (ref 3.8–10.5)
WBC # FLD AUTO: 9.99 K/UL — SIGNIFICANT CHANGE UP (ref 3.8–10.5)

## 2023-06-06 PROCEDURE — 85027 COMPLETE CBC AUTOMATED: CPT

## 2023-06-06 PROCEDURE — 99232 SBSQ HOSP IP/OBS MODERATE 35: CPT

## 2023-06-06 PROCEDURE — C1776: CPT

## 2023-06-06 PROCEDURE — 97110 THERAPEUTIC EXERCISES: CPT

## 2023-06-06 PROCEDURE — 80048 BASIC METABOLIC PNL TOTAL CA: CPT

## 2023-06-06 PROCEDURE — 97116 GAIT TRAINING THERAPY: CPT

## 2023-06-06 PROCEDURE — 97165 OT EVAL LOW COMPLEX 30 MIN: CPT

## 2023-06-06 PROCEDURE — 97535 SELF CARE MNGMENT TRAINING: CPT

## 2023-06-06 PROCEDURE — 97161 PT EVAL LOW COMPLEX 20 MIN: CPT

## 2023-06-06 PROCEDURE — 72170 X-RAY EXAM OF PELVIS: CPT

## 2023-06-06 PROCEDURE — 97530 THERAPEUTIC ACTIVITIES: CPT

## 2023-06-06 PROCEDURE — C1713: CPT

## 2023-06-06 PROCEDURE — 36415 COLL VENOUS BLD VENIPUNCTURE: CPT

## 2023-06-06 PROCEDURE — 81025 URINE PREGNANCY TEST: CPT

## 2023-06-06 RX ORDER — HYDROMORPHONE HYDROCHLORIDE 2 MG/ML
1 INJECTION INTRAMUSCULAR; INTRAVENOUS; SUBCUTANEOUS
Qty: 42 | Refills: 0
Start: 2023-06-06 | End: 2023-06-12

## 2023-06-06 RX ORDER — ASPIRIN/CALCIUM CARB/MAGNESIUM 324 MG
1 TABLET ORAL
Qty: 56 | Refills: 0
Start: 2023-06-06 | End: 2023-07-03

## 2023-06-06 RX ORDER — ACETAMINOPHEN 500 MG
2 TABLET ORAL
Qty: 0 | Refills: 0 | DISCHARGE
Start: 2023-06-06

## 2023-06-06 RX ORDER — CELECOXIB 200 MG/1
1 CAPSULE ORAL
Qty: 56 | Refills: 0
Start: 2023-06-06 | End: 2023-07-03

## 2023-06-06 RX ORDER — OMEPRAZOLE 10 MG/1
1 CAPSULE, DELAYED RELEASE ORAL
Qty: 28 | Refills: 0
Start: 2023-06-06 | End: 2023-07-03

## 2023-06-06 RX ADMIN — Medication 100 MILLIGRAM(S): at 00:11

## 2023-06-06 RX ADMIN — HYDROMORPHONE HYDROCHLORIDE 2 MILLIGRAM(S): 2 INJECTION INTRAMUSCULAR; INTRAVENOUS; SUBCUTANEOUS at 10:18

## 2023-06-06 RX ADMIN — HYDROMORPHONE HYDROCHLORIDE 2 MILLIGRAM(S): 2 INJECTION INTRAMUSCULAR; INTRAVENOUS; SUBCUTANEOUS at 09:48

## 2023-06-06 RX ADMIN — Medication 1000 MILLIGRAM(S): at 14:25

## 2023-06-06 RX ADMIN — Medication 1000 MILLIGRAM(S): at 06:28

## 2023-06-06 RX ADMIN — BUPROPION HYDROCHLORIDE 300 MILLIGRAM(S): 150 TABLET, EXTENDED RELEASE ORAL at 14:25

## 2023-06-06 RX ADMIN — Medication 101.6 MILLIGRAM(S): at 06:03

## 2023-06-06 RX ADMIN — CELECOXIB 200 MILLIGRAM(S): 200 CAPSULE ORAL at 14:24

## 2023-06-06 RX ADMIN — Medication 81 MILLIGRAM(S): at 06:04

## 2023-06-06 RX ADMIN — Medication 1000 MILLIGRAM(S): at 06:03

## 2023-06-06 RX ADMIN — HYDROMORPHONE HYDROCHLORIDE 2 MILLIGRAM(S): 2 INJECTION INTRAMUSCULAR; INTRAVENOUS; SUBCUTANEOUS at 00:11

## 2023-06-06 RX ADMIN — PANTOPRAZOLE SODIUM 40 MILLIGRAM(S): 20 TABLET, DELAYED RELEASE ORAL at 06:11

## 2023-06-06 RX ADMIN — HYDROMORPHONE HYDROCHLORIDE 2 MILLIGRAM(S): 2 INJECTION INTRAMUSCULAR; INTRAVENOUS; SUBCUTANEOUS at 00:35

## 2023-06-06 NOTE — SBIRT NOTE ADULT - NSSBIRTALCPOSREINDET_GEN_A_CORE
Patient does not feel alcohol consumption is problematic at this time. Educated on mindful consumption

## 2023-06-06 NOTE — CARE COORDINATION ASSESSMENT. - NSDCPLANSERVICES_GEN_ALL_CORE
CM met with patient at bedside.  Patient. A&O x 4. Pt s/p  PROCEDURES: Total rt hip replacement.  Pt  resting comfortably / Pt has no complaints at this time.  CM explained role of CM and availability to assist with discharge planning throughout stay.   Provided CM contact information and pt. verbalized understanding.  Patient lives in a private house with her  and mother, 6+5 steps to navigate. 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 Patient identified her  Saeed  as her caregiver at home who will assist her during her recuperation and will transport her home and to MD appointments.   Pt. is agreeable with PT/OT's recommendations for d/c plan to home with HC/PT.  CM explained home care expectations, process, insurance provisions and home safety with good understanding.   Offered list of [facility] and pt. chose [facility] at home care agency.  Provided discharge resources folder. CM made referral accordingly.  Informed them of Anticipated  DC date for today 6/6/2023 and for SOC tomorrow 6/7/2023.     Noted pt has a ONOFRE drsg in place/  Educational flyer provided and reviewed with the patient.  Pt verbalizing understanding and in agreement with d/c plans.   DME: [facility] [phone] rx sent for RW, commode  and hip kit- pt provided with phone number to pay for hip kit.  PCP: Dr Jeremías Yeboah [phone]  Pt stated she will be receiving meds to bed from [facility] pharmacy prior to DC./Home Care

## 2023-06-06 NOTE — CARE COORDINATION ASSESSMENT. - NSCAREPROVIDERS_GEN_ALL_CORE_FT
CARE PROVIDERS:  Administration: Margie Rascon  Administration: Jeannine Shabazz  Administration: Natasha Conde  Administration: Rob Lira  Admitting: Dagoberto Jesus  Attending: Dagoberto Jesus  Consultant: Carlos Rivera  Consultant: Weil, Patricia  Nurse: Nelly Mcghee  Nurse: Dottie Warner  Nurse: Fanta Ramirez  Nurse: Clemente Estrada  Nurse: Keyla Prieto  Nurse: Maliha Celeste  Override: Janet Loja  Override: Edi Womack  Patient Logistics: Avril Park  PCA/Nursing Assistant: Eri Corona  PCA/Nursing Assistant: Angelita Dove  Physical Therapy: Niall Curry  Primary Team: Elian Cabrales  Primary Team: Kay Claire  Primary Team: José Rivas  Primary Team: Irene Marie  : Regine Washington  Student: Alison Mace  Team: [facility] Hospitalists, Team  UR// Supp. Assoc.: Diamond Brady

## 2023-06-06 NOTE — DISCHARGE NOTE NURSING/CASE MANAGEMENT/SOCIAL WORK - NSDCPEFALRISK_GEN_ALL_CORE
For information on Fall & Injury Prevention, visit: [email or web address] OR  [email or web address] OR  https://www.cdc.gov/steadi/patient.html

## 2023-06-06 NOTE — CARE COORDINATION ASSESSMENT. - NSPASTMEDSURGHISTORY_GEN_ALL_CORE_FT
PAST MEDICAL & SURGICAL HISTORY:  H/O colonoscopy      Anxiety and depression      Osteoarthritis of right hip      HTN (hypertension)      S/P breast augmentation

## 2023-06-06 NOTE — CAREGIVER ENGAGEMENT NOTE - DISCHARGE DATE
06-Jun-2023 Called patient to let him know his results are back,  and left message informing him to call the clinic back to go over them.   Gabriela DELEON MA        ----- Message from SHARMIN Smith CNP sent at 11/12/2018  9:05 AM CST -----  Please notify patient, call or letter    His A1C came back at 8.4, which is still too high.  I would like him to see the [facility] pharmacist to discuss better management of his diabetes.  Referral is placed.      Electronically signed by Coretta Obando CNP.

## 2023-06-06 NOTE — PROGRESS NOTE ADULT - ASSESSMENT
52yo PMHx HTN and anxiety/depression s/p R MYLES.     #s/p R MYLES  - Pain control  - Bowel regimen  - PT/OT  - Incentive spirometry  - VTE PPx - ASA BID  - stable for DC from medical perspective    #HTN  resume home Telmisartan - HCTZ on pod2    #Depresison/anxiety  c/w home bupropion

## 2023-06-06 NOTE — PROGRESS NOTE ADULT - SUBJECTIVE AND OBJECTIVE BOX
Discharge medication calendar:  ASA EC 81mg q12h x 4 weeks  APAP 1000mg q8h x 2-3 weeks  Celecoxib 200mg q12h x 2-3 weeks  Omeprazole 20mg QAM x 4 weeks  Narcotic PRN  Docusate 100mg TID while taking narcotic  Miralax, Senna, or Bisacodyl PRN for treatment of constipation  
INTERVAL HPI/OVERNIGHT EVENTS:   Patient seen and examined.  Voided, pain controlled overall, couldn't sleep well (doesn't sleep too well at home either).  No fevers, chills, sweats, dizziness, HA, changes in vision, cp, palpitations, sob, persistent cough, n/v/d, abd pain, dysuria, focal weakness, or calf pain.     REVIEW OF SYSTEMS:  See HPI,  all others negative    PHYSICAL EXAM:  Vital Signs Last 24 Hrs  T(C): 36.7 (2023 07:49), Max: 37.2 (2023 23:30)  T(F): 98.1 (2023 07:49), Max: 98.9 (2023 23:30)  HR: 78 (2023 07:49) (56 - 93)  BP: 143/94 (2023 07:49) (90/62 - 145/84)  BP(mean): --  RR: 18 (2023 07:49) (12 - 20)  SpO2: 98% (2023 07:49) (95% - 100%)    Parameters below as of 2023 07:49  Patient On (Oxygen Delivery Method): room air    GENERAL: NAD, well-groomed, well-developed, awake, alert, oriented x 3, fluent and coherent speech  EYES: EOMI, conjunctiva and sclera clear  ENMT: No tonsillar erythema, exudates, or enlargement; Moist mucous membranes, Good dentition, No lesions seen on oral mucosa  NECK: Supple, No JVD, No Cervical LAD  NERVOUS SYSTEM:  Good concentration; Moving all 4 extremities against gravity; No gross sensory deficits, No facial droop  CHEST/LUNG: Clear to auscultation bilaterally with good air entry; No rales, rhonchi, wheezing, or rubs  HEART: Regular rate and rhythm; No murmurs, rubs, or gallops  ABDOMEN: Soft, Nontender, Nondistended, Bowel sounds present, No palpable masses or organomegaly, No bruits  EXTREMITIES:  2+ Peripheral Pulses, No clubbing, cyanosis, or edema, no calf tenderness in either leg  WOUND: dressing c/d/i    Diagnostic Testin.7   9.99  )-----------( 173      ( 2023 06:00 )             34.3     2023 06:00    139    |  102    |  13     ----------------------------<  127    3.9     |  28     |  0.76     Ca    9.1        2023 06:00               
Orthopaedic Post Op Note    Procedure: Right THR  Surgeon: Duthc Jesus     53y Female comfortable, without complaints. Up to side of bed with PT. Reported pain score = 0  Denies N/V, CP, SOB, numbness/tingling of extremities.    PE:  Vital Signs Last 24 Hrs  T(C): 36.9 (05 Jun 2023 13:42), Max: 36.9 (05 Jun 2023 13:42)  T(F): 98.4 (05 Jun 2023 13:42), Max: 98.4 (05 Jun 2023 13:42)  HR: 92 (05 Jun 2023 13:42) (56 - 95)  BP: 118/78 (05 Jun 2023 13:42) (90/62 - 145/66)  RR: 16 (05 Jun 2023 13:42) (12 - 20)  SpO2: 97% (05 Jun 2023 13:42) (97% - 100%)    Parameters below as of 05 Jun 2023 12:20  Patient On (Oxygen Delivery Method): nasal cannula      General: Pt alert and oriented   Lungs: + BS CTA bilaterally  Heart: +S1 & S2 heard, RRR  Abd: + BS heard, soft, NT, ND  Right Hip ONOFRE Dressing: C/D/I, battery on   Bilateral LEs:  Motor:   5/5 dorsiflexion, plantarflexion, EHL  Sensation intact to LT  2+ DP Pulses  SCDs in place    A/P: 53y Female stable POD#0 s/p Right THR   -  Acetaminophen, Celebrex, Dilaudid  for Pain Control   - DVT ppx: Aspirin 81mg q 12 h   - Kelsi op IV abx: Ancef  - Celebrex for HO ppx  - total hip precautions  - PT, OT per protocol  - F/U AM Labs  - Instructed ONOFRE use/care/removal   DCP = home tomorrow pending PT, OT, medical clearance       
POST OPERATIVE DAY #:  1  STATUS POST: Right THR via posterior approach                      SUBJECTIVE: Patient seen and examined this morning. Ambulated with PT. Tolerating diet. Voiding.   Reported Pain score =2/10 at rest and 5/10 with movement. well controlled with regimen.   Denies: CP/SOB/N/V/Numbness/Tingling    OBJECTIVE:     Vital Signs Last 24 Hrs  T(C): 36.7 (06 Jun 2023 07:49), Max: 37.2 (05 Jun 2023 23:30)  T(F): 98.1 (06 Jun 2023 07:49), Max: 98.9 (05 Jun 2023 23:30)  HR: 78 (06 Jun 2023 07:49) (60 - 93)  BP: 143/94 (06 Jun 2023 07:49) (104/57 - 145/84)  RR: 18 (06 Jun 2023 07:49) (15 - 18)  SpO2: 98% (06 Jun 2023 07:49) (95% - 100%)    Parameters below as of 06 Jun 2023 07:49  Patient On (Oxygen Delivery Method): room air      Gen: AAOx3  Abd: soft, NT, ND  Right hip:         ONOFRE Dressing: clean/dry/intact  flashing green/ok, no erythema or discharge noted.   Bilateral LEs:         Sensation:  intact to light touch          Motor exam:  5/5 dorsiflexion/plantarflexion/EHL          2+ DP pulses          calf supple, NT        Regular pillow use to help maintain abduction.          SCDs in place    LABS:                        11.7   9.99  )-----------( 173      ( 06 Jun 2023 06:00 )             34.3     06-06    139  |  102  |  13  ----------------------------<  127<H>  3.9   |  28  |  0.76    Ca    9.1      06 Jun 2023 06:00            MEDICATIONS:  Anticoagulation:  aspirin enteric coated 81 milliGRAM(s) Oral every 12 hours      Pain medications:   acetaminophen     Tablet .. 1000 milliGRAM(s) Oral every 8 hours  buPROPion XL (24-Hour) . 300 milliGRAM(s) Oral daily  celecoxib 200 milliGRAM(s) Oral every 12 hours  HYDROmorphone   Tablet 2 milliGRAM(s) Oral every 3 hours PRN  HYDROmorphone   Tablet 4 milliGRAM(s) Oral every 3 hours PRN  HYDROmorphone  Injectable 0.5 milliGRAM(s) IV Push every 3 hours PRN        A/P :53y Female, stable   s/p Right THR POD # 1  -    Pain control: Acetaminophen, Celebrex, Dilaudid   -    Cryotherapy with protective skin barrier  -    Incentive Spirometry  -    DVT ppx:  Aspirin 81mg q 12 h  and ambulation as tolerated  -    Weight bearing status: WBAT   -    Medicine Co-management  -    Continue posterior total hip precautions, reviewed with patient and reinforced with PT  -    Physical Therapy  -    Occupational Therapy  -    Discharge plan:  home today pending PT, OT, and Medicine clearance

## 2023-06-06 NOTE — PROGRESS NOTE ADULT - REASON FOR ADMISSION
Patient is a 53y old  Female who presents with a chief complaint of right hip pain s/p right total hip replacement  (05 Jun 2023 11:59)

## 2023-06-06 NOTE — DISCHARGE NOTE NURSING/CASE MANAGEMENT/SOCIAL WORK - PATIENT PORTAL LINK FT
You can access the FollowMyHealth Patient Portal offered by [facility] by registering at the following website: [email or web address]. By joining [facility] FollowMyHealth portal, you will also be able to view your health information using other applications (apps) compatible with our system.

## 2023-06-06 NOTE — DISCHARGE NOTE NURSING/CASE MANAGEMENT/SOCIAL WORK - NSSCNAMETXT_GEN_ALL_CORE
[facility] care agency [phone] will reach out to you within 24-72 hours of your discharge to schedule home care visit/eval appointment with you. Please call agency for any queries regarding home care services

## 2023-06-15 ENCOUNTER — APPOINTMENT (OUTPATIENT)
Dept: ORTHOPEDIC SURGERY | Facility: CLINIC | Age: 53
End: 2023-06-15
Payer: COMMERCIAL

## 2023-06-15 VITALS — WEIGHT: 200 LBS | BODY MASS INDEX: 35 KG/M2 | HEIGHT: 63.5 IN

## 2023-06-15 PROCEDURE — 99024 POSTOP FOLLOW-UP VISIT: CPT

## 2023-06-15 PROCEDURE — 73503 X-RAY EXAM HIP UNI 4/> VIEWS: CPT | Mod: RT

## 2023-06-26 ENCOUNTER — APPOINTMENT (OUTPATIENT)
Dept: ORTHOPEDIC SURGERY | Facility: HOSPITAL | Age: 53
End: 2023-06-26

## 2023-07-13 ENCOUNTER — APPOINTMENT (OUTPATIENT)
Dept: ORTHOPEDIC SURGERY | Facility: CLINIC | Age: 53
End: 2023-07-13

## 2023-09-14 ENCOUNTER — APPOINTMENT (OUTPATIENT)
Dept: ORTHOPEDIC SURGERY | Facility: CLINIC | Age: 53
End: 2023-09-14
Payer: COMMERCIAL

## 2023-09-14 DIAGNOSIS — Z96.641 PRESENCE OF RIGHT ARTIFICIAL HIP JOINT: ICD-10-CM

## 2023-09-14 PROCEDURE — 73503 X-RAY EXAM HIP UNI 4/> VIEWS: CPT | Mod: RT

## 2023-09-14 PROCEDURE — 99213 OFFICE O/P EST LOW 20 MIN: CPT

## 2023-10-18 RX ORDER — BUPROPION HYDROCHLORIDE 150 MG/1
1 TABLET, EXTENDED RELEASE ORAL
Refills: 0 | DISCHARGE

## 2023-10-18 RX ORDER — TELMISARTAN AND HYDROCHLOROTHIAZIDE 40; 12.5 MG/1; MG/1
1 TABLET ORAL
Refills: 0 | DISCHARGE

## 2023-12-11 ENCOUNTER — NON-APPOINTMENT (OUTPATIENT)
Age: 53
End: 2023-12-11

## 2024-02-04 NOTE — DISCHARGE NOTE NURSING/CASE MANAGEMENT/SOCIAL WORK - NSSCTYPOFSERV_GEN_ALL_CORE
General
Physical Therapist to visit the day after hospital discharge; Registered Nurse to follow if there is a need. Please contact the home care agency at the above phone number if you have not heard from them by 12 noon on the day after your hospital discharge

## 2024-06-11 NOTE — STUDENT SIGN OFF DOCUMENT - DOCUMENTS STUDENTS ARE SIGNED OFF ON
-- DO NOT REPLY / DO NOT REPLY ALL --  -- This inbox is not monitored  -- Message is from [facility] ([facility]) --      Message Type:  Refill Medication   Refill request for Pended medication named: Reglan 5 mg  Preferred pharmacy verified, and selected.   [facility] [address] [place], [place] [address], [place] - [address]    Is the patient OUT of Medication?  Yes and Medication Refills handled by [facility] Clinical        Message: Patient is completely out                     Vital Signs/Plan of Care/Assessment and Intervention Occupational Therapy Evaluation

## 2024-10-15 NOTE — BRIEF OPERATIVE NOTE - NSICDXBRIEFPREOP_GEN_ALL_CORE_FT
PRE-OP DIAGNOSIS:  Primary localized osteoarthritis of right hip 05-Jun-2023 07:51:47  Elian Cabrales   No

## 2025-01-03 ENCOUNTER — APPOINTMENT (OUTPATIENT)
Dept: ORTHOPEDIC SURGERY | Facility: CLINIC | Age: 55
End: 2025-01-03
Payer: COMMERCIAL

## 2025-01-03 VITALS — HEIGHT: 63.5 IN | BODY MASS INDEX: 35.87 KG/M2 | WEIGHT: 205 LBS

## 2025-01-03 DIAGNOSIS — R03.0 ELEVATED BLOOD-PRESSURE READING, W/OUT DIAGNOSIS OF HYPERTENSION: ICD-10-CM

## 2025-01-03 DIAGNOSIS — M25.562 PAIN IN LEFT KNEE: ICD-10-CM

## 2025-01-03 DIAGNOSIS — M23.92 UNSPECIFIED INTERNAL DERANGEMENT OF LEFT KNEE: ICD-10-CM

## 2025-01-03 PROCEDURE — 73564 X-RAY EXAM KNEE 4 OR MORE: CPT | Mod: LT

## 2025-01-03 PROCEDURE — 99214 OFFICE O/P EST MOD 30 MIN: CPT

## 2025-01-03 RX ORDER — MELOXICAM 15 MG/1
15 TABLET ORAL
Qty: 30 | Refills: 1 | Status: ACTIVE | COMMUNITY
Start: 2025-01-03 | End: 1900-01-01

## 2025-01-03 RX ORDER — TELMISARTAN AND HYDROCHLOROTHIAZIDE 40; 12.5 MG/1; MG/1
40-12.5 TABLET ORAL
Refills: 0 | Status: ACTIVE | COMMUNITY

## 2025-01-07 ENCOUNTER — APPOINTMENT (OUTPATIENT)
Dept: MRI IMAGING | Facility: CLINIC | Age: 55
End: 2025-01-07

## 2025-01-10 ENCOUNTER — APPOINTMENT (OUTPATIENT)
Dept: ORTHOPEDIC SURGERY | Facility: CLINIC | Age: 55
End: 2025-01-10

## 2025-04-03 ENCOUNTER — APPOINTMENT (OUTPATIENT)
Dept: ORTHOPEDIC SURGERY | Facility: CLINIC | Age: 55
End: 2025-04-03
Payer: COMMERCIAL

## 2025-04-03 DIAGNOSIS — M25.562 PAIN IN LEFT KNEE: ICD-10-CM

## 2025-04-03 PROCEDURE — 99213 OFFICE O/P EST LOW 20 MIN: CPT

## 2025-04-15 ENCOUNTER — APPOINTMENT (OUTPATIENT)
Dept: MRI IMAGING | Facility: CLINIC | Age: 55
End: 2025-04-15
Payer: COMMERCIAL

## 2025-04-15 PROCEDURE — 73721 MRI JNT OF LWR EXTRE W/O DYE: CPT | Mod: LT

## 2025-04-24 ENCOUNTER — APPOINTMENT (OUTPATIENT)
Dept: ORTHOPEDIC SURGERY | Facility: CLINIC | Age: 55
End: 2025-04-24
Payer: COMMERCIAL

## 2025-04-24 DIAGNOSIS — S83.529A SPRAIN OF POSTERIOR CRUCIATE LIGAMENT OF UNSPECIFIED KNEE, INITIAL ENCOUNTER: ICD-10-CM

## 2025-04-24 DIAGNOSIS — S83.522D SPRAIN OF POSTERIOR CRUCIATE LIGAMENT OF LEFT KNEE, SUBSEQUENT ENCOUNTER: ICD-10-CM

## 2025-04-24 PROCEDURE — 99214 OFFICE O/P EST MOD 30 MIN: CPT

## 2025-04-24 RX ORDER — MELOXICAM 15 MG/1
15 TABLET ORAL
Qty: 30 | Refills: 2 | Status: ACTIVE | COMMUNITY
Start: 2025-04-24 | End: 1900-01-01

## (undated) DEVICE — DRAPE IRRIGATION POUCH 19X23"

## (undated) DEVICE — SUT VICRYL 2-0 36" CT-1 UNDYED

## (undated) DEVICE — MARKING PEN W RULER

## (undated) DEVICE — SUT VICRYL 1 36" CTX UNDYED

## (undated) DEVICE — SUT STRATAFIX SPIRAL MONOCRYL PLUS 4-0 30CM PS-2 UNDYED

## (undated) DEVICE — HOOD FLYTE STRYKER HELMET SHIELD

## (undated) DEVICE — SPHERE STERILE

## (undated) DEVICE — NDL HYPO SAFE 20G X 1.5" (YELLOW)

## (undated) DEVICE — SUT ETHIBOND 5 4-30" V-37

## (undated) DEVICE — DRAPE CAMERA MINI 3D

## (undated) DEVICE — HOOD T5 PEELAWAY

## (undated) DEVICE — WOUND IRR IRRISEPT W 0.5 CHG

## (undated) DEVICE — PLASTIC SOLUTION BOWL 160Z

## (undated) DEVICE — SAW BLADE STRYKER SAGITTAL 24.7X0.89X73.7MM

## (undated) DEVICE — POSITIONER FOAM ABDUCTION PILLOW MED (PINK)

## (undated) DEVICE — SUT VICRYL 3-0 36" CT-1

## (undated) DEVICE — ELCTR STRYKER NEPTUNE SMOKE EVACUATION PENCIL (GREEN)

## (undated) DEVICE — ELCTR STRYKER NEPTUNE BLADE COATED, INSULATED 70MM

## (undated) DEVICE — ELCTR BOVIE TIP BLADE INSULATED 6.5" EDGE

## (undated) DEVICE — SUT MONOCRYL 3-0 27" PS-2 UNDYED

## (undated) DEVICE — STAPLER SKIN VISI-STAT 35 WIDE

## (undated) DEVICE — DRAPE HIP W POUCHES 87X115X134"

## (undated) DEVICE — PREP CHLORAPREP HI-LITE ORANGE 26ML

## (undated) DEVICE — DRSG PICO NPWT 4X12"

## (undated) DEVICE — DRAPE STERI-DRAPE INCISE 19X17"

## (undated) DEVICE — DRSG MEPILEX 10 X 10CM (4 X 4") AG

## (undated) DEVICE — BLADE SURGICAL #11 CARBON

## (undated) DEVICE — DRAPE INSTRUMENT POUCH 6.75" X 11"

## (undated) DEVICE — Device

## (undated) DEVICE — DRAPE STERI-DRAPE INCISE 32X33"

## (undated) DEVICE — DRAPE 3/4 SHEET 52X76"

## (undated) DEVICE — SUCTION YANKAUER TAPERED BULBOUS NO VENT

## (undated) DEVICE — SYR LUER LOK 20CC

## (undated) DEVICE — DRSG STOCKINETTE TUBULAR COTTON 1PLY 6X72"

## (undated) DEVICE — SOL IRR BAG NS 0.9% 3000ML

## (undated) DEVICE — DRSG STOCKINETTE TUBULAR COTTON 2PLY 6X72"

## (undated) DEVICE — GOWN XXL

## (undated) DEVICE — PACK TOTAL HIP

## (undated) DEVICE — DRAPE 1/2 SHEET 40X57"

## (undated) DEVICE — ZIMMER PULSAVAC PLUS FAN KIT

## (undated) DEVICE — GOWN IMPERV BREATHABLE XL